# Patient Record
Sex: FEMALE | Race: WHITE | NOT HISPANIC OR LATINO | Employment: UNEMPLOYED | URBAN - METROPOLITAN AREA
[De-identification: names, ages, dates, MRNs, and addresses within clinical notes are randomized per-mention and may not be internally consistent; named-entity substitution may affect disease eponyms.]

---

## 2017-06-08 ENCOUNTER — TELEPHONE (OUTPATIENT)
Dept: OBSTETRICS AND GYNECOLOGY | Facility: CLINIC | Age: 52
End: 2017-06-08

## 2017-06-08 NOTE — TELEPHONE ENCOUNTER
Returned pt call and pt stated that she had her yearly pelvic U/S and [place] that [place] be removed. Informed pt that  is out the office and will not be back in the office until Tuesday and her message will be forward. Pt verbalized understanding.

## 2017-06-08 NOTE — TELEPHONE ENCOUNTER
----- Message from Laura Christiansen sent at 6/8/2017  1:49 PM CDT -----  Contact: self  Patient is requesting a call from the staff, Patient states she would like to discuss her transvaginal ultrasound results, patient states her other obgyn recommended surgery to remove the ovaries and patient is requesting Dr. Garvin be the surgeon. Patient can be reached at [phone].

## 2017-06-13 ENCOUNTER — TELEPHONE (OUTPATIENT)
Dept: OBSTETRICS AND GYNECOLOGY | Facility: CLINIC | Age: 52
End: 2017-06-13

## 2017-06-13 DIAGNOSIS — N83.8 OVARIAN MASS: Primary | ICD-10-CM

## 2017-06-13 NOTE — TELEPHONE ENCOUNTER
Called pt regarding her concern re a mass on the ovary-ca125 has gone from 21 to 31--her doctor is having the Gyn Oncologist look at the reports--if need be would have Dr. Ruelas review and maybe do the surgery here

## 2017-06-23 ENCOUNTER — TELEPHONE (OUTPATIENT)
Dept: GYNECOLOGIC ONCOLOGY | Facility: CLINIC | Age: 52
End: 2017-06-23

## 2017-06-23 NOTE — TELEPHONE ENCOUNTER
----- Message from Claudia Allen sent at 6/23/2017 10:35 AM CDT -----  Contact: Pt can be reached at [phone]  Mathieu Jenkins MD requesting pt to reach out to Dr. Jane for hysterectomy surgery.      Pt has all images and records.      Please contact pt.  Thank you!

## 2017-06-27 ENCOUNTER — TELEPHONE (OUTPATIENT)
Dept: GYNECOLOGIC ONCOLOGY | Facility: CLINIC | Age: 52
End: 2017-06-27

## 2017-06-27 NOTE — TELEPHONE ENCOUNTER
----- Message from Walker Zeng MA sent at 6/23/2017  1:36 PM CDT -----  Contact: Pt can be reached at [phone]      ----- Message -----  From: Claudia Pittman  Sent: 6/23/2017  10:35 AM  To: [facility] Staff    Mathieu Jenknis MD requesting pt to reach out to Dr. Jane for hysterectomy surgery.      Pt has all images and records.      Please contact pt.  Thank you!

## 2017-07-10 ENCOUNTER — TELEPHONE (OUTPATIENT)
Dept: OBSTETRICS AND GYNECOLOGY | Facility: CLINIC | Age: 52
End: 2017-07-10

## 2017-07-10 NOTE — TELEPHONE ENCOUNTER
Talked w pt and I rec if she is going to have surgery to have all removed uterus tubes and ovaries

## 2017-10-26 ENCOUNTER — TELEPHONE (OUTPATIENT)
Dept: PULMONOLOGY | Facility: CLINIC | Age: 52
End: 2017-10-26

## 2017-10-26 NOTE — TELEPHONE ENCOUNTER
Spoke with mother of a pediatric pt. The mother would like to know if the asthma test was performed for children on the 9th floor pulmonary department. I advised the mother that we do not test pediatric pts on our side of the clinic but that I can transfer her to the pediatric department. The mother stated asking if I was trained before working at [facility]. I made the mother aware of the peds clinic across the street as well as gave the mother the phone number. I transferred the mother to the peds department afterward.